# Patient Record
Sex: MALE | Race: BLACK OR AFRICAN AMERICAN | NOT HISPANIC OR LATINO | Employment: STUDENT | URBAN - METROPOLITAN AREA
[De-identification: names, ages, dates, MRNs, and addresses within clinical notes are randomized per-mention and may not be internally consistent; named-entity substitution may affect disease eponyms.]

---
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
## 2022-03-25 ENCOUNTER — TELEPHONE (OUTPATIENT)
Dept: PSYCHIATRY | Facility: CLINIC | Age: 22
End: 2022-03-25
Payer: COMMERCIAL

## 2022-03-25 NOTE — TELEPHONE ENCOUNTER
V/m was left @12:00----- Message from Willow Ribera sent at 3/25/2022 11:31 AM CDT -----  Contact: Chiquis, Mother  Chiquis called regarding scheduling an appointment for [place], please give her a call back at [phone]      Thanks  Kb

## 2022-05-13 ENCOUNTER — OFFICE VISIT (OUTPATIENT)
Dept: PSYCHIATRY | Facility: CLINIC | Age: 22
End: 2022-05-13
Payer: COMMERCIAL

## 2022-05-13 DIAGNOSIS — F43.21 GRIEF: ICD-10-CM

## 2022-05-13 DIAGNOSIS — F32.A DEPRESSIVE DISORDER: Primary | ICD-10-CM

## 2022-05-13 PROCEDURE — 90791 PSYCH DIAGNOSTIC EVALUATION: CPT | Mod: S$GLB,,, | Performed by: SOCIAL WORKER

## 2022-05-13 PROCEDURE — 90791 PR PSYCHIATRIC DIAGNOSTIC EVALUATION: ICD-10-PCS | Mod: S$GLB,,, | Performed by: SOCIAL WORKER

## 2022-05-13 PROCEDURE — 99999 PR PBB SHADOW E&M-EST. PATIENT-LVL I: CPT | Mod: PBBFAC,,, | Performed by: SOCIAL WORKER

## 2022-05-13 PROCEDURE — 99999 PR PBB SHADOW E&M-EST. PATIENT-LVL I: ICD-10-PCS | Mod: PBBFAC,,, | Performed by: SOCIAL WORKER

## 2022-05-26 ENCOUNTER — OFFICE VISIT (OUTPATIENT)
Dept: PSYCHIATRY | Facility: CLINIC | Age: 22
End: 2022-05-26
Payer: COMMERCIAL

## 2022-05-26 DIAGNOSIS — F43.21 GRIEF: ICD-10-CM

## 2022-05-26 DIAGNOSIS — F32.A DEPRESSIVE DISORDER: Primary | ICD-10-CM

## 2022-05-26 PROCEDURE — 99999 PR PBB SHADOW E&M-EST. PATIENT-LVL I: CPT | Mod: PBBFAC,,, | Performed by: SOCIAL WORKER

## 2022-05-26 PROCEDURE — 90834 PR PSYCHOTHERAPY W/PATIENT, 45 MIN: ICD-10-PCS | Mod: S$GLB,,, | Performed by: SOCIAL WORKER

## 2022-05-26 PROCEDURE — 99999 PR PBB SHADOW E&M-EST. PATIENT-LVL I: ICD-10-PCS | Mod: PBBFAC,,, | Performed by: SOCIAL WORKER

## 2022-05-26 PROCEDURE — 90834 PSYTX W PT 45 MINUTES: CPT | Mod: S$GLB,,, | Performed by: SOCIAL WORKER

## 2022-07-08 ENCOUNTER — OFFICE VISIT (OUTPATIENT)
Dept: PSYCHIATRY | Facility: CLINIC | Age: 22
End: 2022-07-08
Payer: COMMERCIAL

## 2022-07-08 DIAGNOSIS — F32.A DEPRESSIVE DISORDER: Primary | ICD-10-CM

## 2022-07-08 DIAGNOSIS — F43.21 GRIEF: ICD-10-CM

## 2022-07-08 PROCEDURE — 90834 PR PSYCHOTHERAPY W/PATIENT, 45 MIN: ICD-10-PCS | Mod: S$GLB,,, | Performed by: SOCIAL WORKER

## 2022-07-08 PROCEDURE — 99999 PR PBB SHADOW E&M-EST. PATIENT-LVL I: ICD-10-PCS | Mod: PBBFAC,,, | Performed by: SOCIAL WORKER

## 2022-07-08 PROCEDURE — 99999 PR PBB SHADOW E&M-EST. PATIENT-LVL I: CPT | Mod: PBBFAC,,, | Performed by: SOCIAL WORKER

## 2022-07-08 PROCEDURE — 90834 PSYTX W PT 45 MINUTES: CPT | Mod: S$GLB,,, | Performed by: SOCIAL WORKER

## 2022-07-08 NOTE — PROGRESS NOTES
Psychiatry Initial Visit (PhD/LCSW)  Diagnostic Interview - CPT 46303    Date: 5/13/2022    Site: [place]    Referral source: [facility]erral Self    Clinical status of patient: Outpatient    Pio Roque, a 22 y.o. male, for initial evaluation visit.  Met with patient.    Chief complaint/reason for encounter: depression, somatic and grief    History of present illness:  22 year old patient presented as self-referral for initial assessment.  Chief complaint of depressive symptoms the past year, following murder of his best friend.  Patient grieving, endorsed distractedness, loss of future-oriented thinking, deep sadness, loss of keerthi.  Loss of enjoyment of activities. Reported some physical pain issues from injury damage to his right hand thumb, adding to his stress level.  Patient said he works for an [employer] but is a student, in prerequisites for BR to a 2 year [employer] program.  Patient said right after high school graduation, he did a semester at [employer] for computer science but dropped out, not feeling it was right for him. Has been self-medicating with marijuana use regularly.  Feeling not very social, some social self-isolating.  Pervasive sadness.  Denied any suicidal or homicidal ideation.  Stated the murderer was convicted and sentenced, but the patient did not have the motivation to even pay attention to how that turned out or what the sentence was.  Patient denied any alcohol abuse, tobacco use, or use of hard drugs, but endorsed reliance on marijuana to blot out difficult emotions.  Identified therapeutic goals of reducing depression, processing grief, and enhancing coping skills.      Pain: moderate to right hand    Symptoms:   · Mood: depressed mood, diminished interest, fatigue, poor concentration and unresolved grief  · Anxiety: denied  · Substance abuse: self-medicating with marijuana  · Cognitive functioning: denied  · Health behaviors: noncontributory    Psychiatric history:  "none    Medical history:  Right thumb injury.  Otherwise normally developed with no identified chronic conditions.     Family history of psychiatric illness: none    Social history (marriage, employment, etc.):  Born and raised in [place].  Younger of two brothers, with brother being five years older.  Raised by mother and maternal grandmother.  Biological father was know to the patient and visited periodically.  Attended Religious early on at the insistence of maternal grandmother.  Now non-active in organized [employer]ond[employer] but with a personalspiritual genesis.  Graduated high school.  Works and is currently completing [employer] prerequisites with the aim of getting into an industrial  certification program.  Works for a [employer].  No  history.  No gun ownership.  Single, unattached, no children.  Best friend was murdered in 2021.     Substance use:   Alcohol: "rarely."    Drugs: denied any hard drug use.  Endorsed frequent, near-daily use of marijuana, self-medicating with it.    Tobacco: none   Caffeine: not reported    Current medications and drug reactions (include OTC, herbal): see medication list      Strengths and liabilities: Strength: Patient accepts guidance/feedback, Strength: Patient is motivated for change., Strength: Patient is physically healthy., Liability: Patient lacks social skills., Liability: Patient lacks coping skills.    Current Evaluation:     Mental Status Exam:  General Appearance:  unremarkable, age appropriate, normal weight, casually dressed   Speech: normal tone, normal rate, normal pitch, normal volume      Level of Cooperation: cooperative      Thought Processes: goal-directed   Mood: depressed, sad      Thought Content: normal, no suicidality, no homicidality, delusions, or paranoia   Affect: blunted   Orientation: Oriented x3   Memory: recent and remote memory intact   Attention Span & Concentration: intact   Fund of General Knowledge: intact and " appropriate to age and level of education   Abstract Reasoning: Not formally assessed   Judgment & Insight: limited     Language  intact     Diagnostic Impression - Plan:       ICD-10-CM ICD-9-CM   1. Depressive disorder  F32.9 311   2. Grief  F43.21 309.0       Plan:individual psychotherapy    Return to Clinic: as scheduled, 5/26/22    Length of Service (minutes): 45

## 2022-07-19 NOTE — PROGRESS NOTES
"Individual Psychotherapy (PhD/LCSW)    5/26/2022    Site:   [place]     Therapeutic Intervention: Met with patient.  Outpatient - Insight oriented psychotherapy 45 min - CPT code 73164 and Outpatient - Supportive psychotherapy 45 min - CPT Code 53520    Chief complaint/reason for encounter: depression and grief     Interval history and content of current session:   Late entry for 5/26/22.  22 year old male patient returned to clinic for follow up psychotherapy.  Presented with blunted affect, soft-spoken.  Said he is "trying not to think, lately."  Patient reported work "has been work."  Works early shifts at [employer].  Uneventful.  Stated he was about to start summer courses May 31st.  Patient resides with his mother, grandmother, and brother.  Visits his paternal grandmother nearly daily.  Denied talking to anyone about his murdered friend.  Actively avoids the subject but thinks about him frequently.  Patient denied any si/hi, mood swings, psychosis, or substance abuse.  Supportive therapy provided.       Treatment plan:  · Target symptoms: depression, grief  · Why chosen therapy is appropriate versus another modality: relevant to diagnosis; patient responsive to this modality.  · Outcome monitoring methods:  self-report; observation.  · Therapeutic intervention type: insight-oriented psychotherapy; supportive psychotherapy.    Risk parameters:  Patient reports no suicidal ideation  Patient reports no homicidal ideation  Patient reports no self-injurious behavior  Patient reports no violent behavior    Verbal deficits: None    Patient's response to intervention:  The patient's response to intervention is accepting    Progress toward goals and other mental status changes:  The patient's progress toward goals is limited.    Diagnosis:     ICD-10-CM ICD-9-CM   1. Depressive disorder  F32.9 311   2. Grief  F43.21 309.0       Plan:  individual psychotherapy    Return to clinic: as scheduled,. 7/8/22     Length of " Service (minutes): 45

## 2022-07-23 NOTE — PROGRESS NOTES
Individual Psychotherapy (PhD/LCSW)    7/8/2022    Site:   [facility]     Therapeutic Intervention: Met with patient.  Outpatient - Insight oriented psychotherapy 45 min - CPT code 38851 and Outpatient - Supportive psychotherapy 45 min - CPT Code 47261    Chief complaint/reason for encounter: depression and grief     Interval history and content of current session:   Late entry for 7/8/22.  22 year old male patient returned to clinic for psychotherapy to address depression and grief.  Patient was quiet and soft-spoken throughout the session, not initiating any discussion himself but only answering questions and with short answers.  Patient stated he has always been very reserved and not very comfortable talking.  He reported little has changed in the interim, since the 5/26/22 session.  Continues working for the [employer].  Said he tries not to think much, still trying to avoid thoughts of his late friend, but finding himself thinking of him daily.  Discussed with him value in processing his thoughts, rather than running from them.    Patient denied any si/hi, mood swings, psychosis, or substance abuse.  Supportive therapy provided.   Patient planning to schedule online.  Within the month recommended, tba.    Treatment plan:  · Target symptoms: depression, grief  · Why chosen therapy is appropriate versus another modality: relevant to diagnosis; patient responsive to this modality.  · Outcome monitoring methods:  self-report; observation.  · Therapeutic intervention type: insight-oriented psychotherapy; supportive psychotherapy.    Risk parameters:  Patient reports no suicidal ideation  Patient reports no homicidal ideation  Patient reports no self-injurious behavior  Patient reports no violent behavior    Verbal deficits: None    Patient's response to intervention:  The patient's response to intervention is accepting    Progress toward goals and other mental status changes:  The patient's progress toward goals is  limited.    Diagnosis:     ICD-10-CM ICD-9-CM   1. Depressive disorder  F32.9 311   2. Grief  F43.21 309.0       Plan:  individual psychotherapy    Return to clinic:  As able, recommended within the month, tba    Length of Service (minutes): 45

## (undated) DEVICE — SUT MCRYL PLUS 4-0 PS2 27IN

## (undated) DEVICE — PACK ARTHROSCOPY

## (undated) DEVICE — Device

## (undated) DEVICE — DRESSING SPONGE 16PLY 4X4 NS

## (undated) DEVICE — DRAPE PLASTIC U 60X72

## (undated) DEVICE — SUT MONOCRYL 4-0 UND RB-1

## (undated) DEVICE — PAD KNEE POLAR XL

## (undated) DEVICE — SEE MEDLINE ITEM 146271

## (undated) DEVICE — NDL SPINAL 18GX3.5 SPINOCAN

## (undated) DEVICE — GAUZE SPONGE 4X4 12PLY

## (undated) DEVICE — PAD COLD THERAPY KNEE WRAP ON

## (undated) DEVICE — SUT 3-0 CTD VICRYL 27IN PS

## (undated) DEVICE — BLADE SURG CARBON STEEL SZ11

## (undated) DEVICE — SOL IRR NACL .9% 3000ML

## (undated) DEVICE — STRIP STERI REIN CLSR 1/2X2IN

## (undated) DEVICE — DRAPE STERI U-SHAPED 47X51IN

## (undated) DEVICE — IMMOB KNEE UNIV TRI PANEL 19IN

## (undated) DEVICE — TUBE SET INFLOW/OUTFLOW

## (undated) DEVICE — PADDING CAST 4IN DELTA ROLL

## (undated) DEVICE — SEE MEDLINE ITEM 146345

## (undated) DEVICE — SUT VICRYL 3-0 27 SH